# Patient Record
Sex: FEMALE | Race: OTHER | Employment: OTHER | URBAN - METROPOLITAN AREA
[De-identification: names, ages, dates, MRNs, and addresses within clinical notes are randomized per-mention and may not be internally consistent; named-entity substitution may affect disease eponyms.]

---

## 2021-03-23 NOTE — PATIENT DISCUSSION
STOP SELF PERSCRIBED TOBRADEX.  START MOXIFLOXACIN 1 GTT Q15 MINUTES FOR FIRST HOUR.   THEN TWICE MORE THIS EVENING.

## 2021-03-23 NOTE — PATIENT DISCUSSION
PHOTO AND PRESCRIBE MOXIFLOXACIN Q15 MINS FOR FIRST HOUR THEN BID FOR REST OF TODAY.   THEN QID FOR 1 WK.

## 2021-04-06 ENCOUNTER — NEW PATIENT COMPREHENSIVE (OUTPATIENT)
Dept: URBAN - METROPOLITAN AREA CLINIC 32 | Facility: CLINIC | Age: 61
End: 2021-04-06

## 2021-04-06 DIAGNOSIS — H25.13: ICD-10-CM

## 2021-04-06 PROCEDURE — 92004 COMPRE OPH EXAM NEW PT 1/>: CPT

## 2021-04-06 PROCEDURE — 92015 DETERMINE REFRACTIVE STATE: CPT

## 2021-04-06 ASSESSMENT — TONOMETRY
OD_IOP_MMHG: 12
OS_IOP_MMHG: 13

## 2021-04-06 ASSESSMENT — VISUAL ACUITY
OU_CC: J1+
OS_SC: J3
OD_CC: J1
OS_SC: 20/20
OU_SC: 20/20-1
OS_CC: J1+-2
OD_SC: J3
OD_SC: 20/20-3

## 2021-04-06 NOTE — PATIENT DISCUSSION
Patient can resume CL wear. Patient is to use Clear Care when she takes lenses out after extended wear.

## 2022-07-18 ENCOUNTER — COMPREHENSIVE EXAM (OUTPATIENT)
Dept: URBAN - METROPOLITAN AREA CLINIC 32 | Facility: CLINIC | Age: 62
End: 2022-07-18

## 2022-07-18 DIAGNOSIS — H52.03: ICD-10-CM

## 2022-07-18 DIAGNOSIS — H00.11: ICD-10-CM

## 2022-07-18 DIAGNOSIS — H52.202: ICD-10-CM

## 2022-07-18 DIAGNOSIS — H52.4: ICD-10-CM

## 2022-07-18 DIAGNOSIS — H25.13: ICD-10-CM

## 2022-07-18 PROCEDURE — 92015 DETERMINE REFRACTIVE STATE: CPT

## 2022-07-18 PROCEDURE — 92014 COMPRE OPH EXAM EST PT 1/>: CPT

## 2022-07-18 ASSESSMENT — VISUAL ACUITY
OD_SC: 20/25
OU_SC: 20/25
OS_SC: 20/25

## 2022-07-18 ASSESSMENT — TONOMETRY
OD_IOP_MMHG: 10
OS_IOP_MMHG: 10

## 2022-08-29 ENCOUNTER — CONSULTATION/EVALUATION (OUTPATIENT)
Dept: URBAN - METROPOLITAN AREA CLINIC 32 | Facility: CLINIC | Age: 62
End: 2022-08-29

## 2022-08-29 DIAGNOSIS — H00.11: ICD-10-CM

## 2022-08-29 DIAGNOSIS — H02.884: ICD-10-CM

## 2022-08-29 DIAGNOSIS — H02.881: ICD-10-CM

## 2022-08-29 DIAGNOSIS — H25.13: ICD-10-CM

## 2022-08-29 PROCEDURE — 99213 OFFICE O/P EST LOW 20 MIN: CPT

## 2022-08-29 RX ORDER — NEOMYCIN SULFATE, POLYMYXIN B SULFATE AND DEXAMETHASONE 3.5; 10000; 1 MG/G; [USP'U]/G; MG/G
OINTMENT OPHTHALMIC TWICE A DAY
Start: 2022-08-29

## 2022-08-29 ASSESSMENT — VISUAL ACUITY
OS_SC: 20/20-2
OD_PH: 20/30
OD_SC: 20/40

## 2022-08-29 ASSESSMENT — TONOMETRY
OS_IOP_MMHG: 10
OD_IOP_MMHG: 09

## 2022-09-08 ENCOUNTER — CLINIC PROCEDURE ONLY (OUTPATIENT)
Dept: URBAN - METROPOLITAN AREA CLINIC 32 | Facility: CLINIC | Age: 62
End: 2022-09-08

## 2022-09-08 DIAGNOSIS — H00.11: ICD-10-CM

## 2022-09-08 PROCEDURE — 67800 REMOVE EYELID LESION: CPT

## 2022-09-22 ENCOUNTER — FOLLOW UP (OUTPATIENT)
Dept: URBAN - METROPOLITAN AREA CLINIC 32 | Facility: CLINIC | Age: 62
End: 2022-09-22

## 2022-09-22 DIAGNOSIS — H00.11: ICD-10-CM

## 2022-09-22 DIAGNOSIS — H02.881: ICD-10-CM

## 2022-09-22 DIAGNOSIS — H02.884: ICD-10-CM

## 2022-09-22 PROCEDURE — 99213 OFFICE O/P EST LOW 20 MIN: CPT

## 2022-09-22 ASSESSMENT — VISUAL ACUITY
OS_SC: J5
OD_SC: 20/40-1
OD_SC: J5
OS_SC: 20/30-1

## 2022-09-22 ASSESSMENT — TONOMETRY
OD_IOP_MMHG: 10
OS_IOP_MMHG: 10

## 2023-04-06 ENCOUNTER — FOLLOW UP (OUTPATIENT)
Dept: URBAN - METROPOLITAN AREA CLINIC 32 | Facility: CLINIC | Age: 63
End: 2023-04-06

## 2023-04-06 DIAGNOSIS — H00.14: ICD-10-CM

## 2023-04-06 PROCEDURE — 99212 OFFICE O/P EST SF 10 MIN: CPT

## 2023-04-06 ASSESSMENT — TONOMETRY
OD_IOP_MMHG: 10
OS_IOP_MMHG: 10

## 2023-04-06 ASSESSMENT — VISUAL ACUITY
OD_SC: 20/20
OS_SC: 20/20

## 2023-08-08 ENCOUNTER — COMPREHENSIVE EXAM (OUTPATIENT)
Dept: URBAN - METROPOLITAN AREA CLINIC 32 | Facility: CLINIC | Age: 63
End: 2023-08-08

## 2023-08-08 DIAGNOSIS — H25.13: ICD-10-CM

## 2023-08-08 DIAGNOSIS — H02.88A: ICD-10-CM

## 2023-08-08 DIAGNOSIS — H43.393: ICD-10-CM

## 2023-08-08 DIAGNOSIS — H43.813: ICD-10-CM

## 2023-08-08 DIAGNOSIS — H16.223: ICD-10-CM

## 2023-08-08 DIAGNOSIS — H02.88B: ICD-10-CM

## 2023-08-08 PROCEDURE — 99214 OFFICE O/P EST MOD 30 MIN: CPT

## 2023-08-08 PROCEDURE — 92015 DETERMINE REFRACTIVE STATE: CPT

## 2023-08-08 PROCEDURE — 92134 CPTRZ OPH DX IMG PST SGM RTA: CPT

## 2023-08-08 ASSESSMENT — TONOMETRY
OD_IOP_MMHG: 10
OS_IOP_MMHG: 10

## 2023-08-08 ASSESSMENT — VISUAL ACUITY
OS_CC: J1+
OS_SC: J2
OD_GLARE: 20/50
OS_SC: 20/20
OS_GLARE: 20/40
OD_SC: 20/25
OD_SC: J5
OD_CC: J1+